# Patient Record
Sex: MALE | URBAN - METROPOLITAN AREA
[De-identification: names, ages, dates, MRNs, and addresses within clinical notes are randomized per-mention and may not be internally consistent; named-entity substitution may affect disease eponyms.]

---

## 2021-04-19 ENCOUNTER — OFFICE VISIT (OUTPATIENT)
Dept: URBAN - METROPOLITAN AREA CLINIC 48 | Facility: CLINIC | Age: 46
End: 2021-04-19
Payer: COMMERCIAL

## 2021-04-19 DIAGNOSIS — E11.9 TYPE 2 DIABETES MELLITUS W/O COMPLICATION: Primary | ICD-10-CM

## 2021-04-19 DIAGNOSIS — H04.123 DRY EYE SYNDROME OF BILATERAL LACRIMAL GLANDS: ICD-10-CM

## 2021-04-19 PROCEDURE — 92004 COMPRE OPH EXAM NEW PT 1/>: CPT | Performed by: STUDENT IN AN ORGANIZED HEALTH CARE EDUCATION/TRAINING PROGRAM

## 2021-04-19 ASSESSMENT — INTRAOCULAR PRESSURE
OD: 18
OS: 18

## 2021-04-19 ASSESSMENT — VISUAL ACUITY
OS: 20/40
OD: 20/30

## 2021-04-19 NOTE — IMPRESSION/PLAN
Impression: Type 2 diabetes mellitus w/o complication: M85.2. Plan: - No retinopathy seen on exam today
- Continue glucose, BP, and lipid control as per PCP
- Given uncontrolled glucose will reassess pt. in 2-3 months.  

RTC 2-3 months for DFE with OCT MAC

## 2021-04-19 NOTE — IMPRESSION/PLAN
Impression: Dry eye syndrome of bilateral lacrimal glands: H04.123. Plan: Dry eyes likely cause for the patient's complaints. There is no evidence of permanent changes to the cornea. Explained the nature of the condition and need for lubrication with artificial tears.  

Start OTC PFAFT PRN OU (Recommended Refresh or Systane)